# Patient Record
Sex: FEMALE | Race: WHITE | Employment: PART TIME | URBAN - METROPOLITAN AREA
[De-identification: names, ages, dates, MRNs, and addresses within clinical notes are randomized per-mention and may not be internally consistent; named-entity substitution may affect disease eponyms.]

---

## 2024-05-20 PROCEDURE — 99285 EMERGENCY DEPT VISIT HI MDM: CPT

## 2024-05-20 PROCEDURE — 99284 EMERGENCY DEPT VISIT MOD MDM: CPT

## 2024-05-20 RX ORDER — SODIUM CHLORIDE 9 MG/ML
125 INJECTION, SOLUTION INTRAVENOUS CONTINUOUS
Status: DISCONTINUED | OUTPATIENT
Start: 2024-05-20 | End: 2024-05-21

## 2024-05-20 RX ORDER — ESTRADIOL 0.1 MG/G
1 CREAM VAGINAL
COMMUNITY
Start: 2024-05-06

## 2024-05-21 ENCOUNTER — HOSPITAL ENCOUNTER (EMERGENCY)
Facility: HOSPITAL | Age: 79
Discharge: HOME | End: 2024-05-21
Attending: EMERGENCY MEDICINE

## 2024-05-21 ENCOUNTER — APPOINTMENT (OUTPATIENT)
Dept: CT IMAGING | Facility: HOSPITAL | Age: 79
End: 2024-05-21
Attending: EMERGENCY MEDICINE

## 2024-05-21 VITALS
HEART RATE: 60 BPM | HEIGHT: 66 IN | OXYGEN SATURATION: 99 % | DIASTOLIC BLOOD PRESSURE: 63 MMHG | SYSTOLIC BLOOD PRESSURE: 120 MMHG | TEMPERATURE: 99 F | RESPIRATION RATE: 14 BRPM | BODY MASS INDEX: 23.78 KG/M2 | WEIGHT: 148 LBS

## 2024-05-21 DIAGNOSIS — M54.9 BACK PAIN WITHOUT RADIATION: Primary | ICD-10-CM

## 2024-05-21 LAB
ALBUMIN SERPL-MCNC: 3.6 G/DL (ref 3.4–5)
ALBUMIN/GLOB SERPL: 1.1 {RATIO} (ref 1–2)
ALP LIVER SERPL-CCNC: 84 U/L
ALT SERPL-CCNC: 33 U/L
ANION GAP SERPL CALC-SCNC: 4 MMOL/L (ref 0–18)
AST SERPL-CCNC: 28 U/L (ref 15–37)
BASOPHILS # BLD AUTO: 0.04 X10(3) UL (ref 0–0.2)
BASOPHILS NFR BLD AUTO: 0.5 %
BILIRUB SERPL-MCNC: 0.3 MG/DL (ref 0.1–2)
BILIRUB UR QL STRIP.AUTO: NEGATIVE
BUN BLD-MCNC: 22 MG/DL (ref 9–23)
CALCIUM BLD-MCNC: 9.3 MG/DL (ref 8.5–10.1)
CHLORIDE SERPL-SCNC: 111 MMOL/L (ref 98–112)
CLARITY UR REFRACT.AUTO: CLEAR
CO2 SERPL-SCNC: 27 MMOL/L (ref 21–32)
COLOR UR AUTO: YELLOW
CREAT BLD-MCNC: 0.91 MG/DL
EGFRCR SERPLBLD CKD-EPI 2021: 64 ML/MIN/1.73M2 (ref 60–?)
EOSINOPHIL # BLD AUTO: 0.29 X10(3) UL (ref 0–0.7)
EOSINOPHIL NFR BLD AUTO: 3.5 %
ERYTHROCYTE [DISTWIDTH] IN BLOOD BY AUTOMATED COUNT: 13.2 %
GLOBULIN PLAS-MCNC: 3.2 G/DL (ref 2.8–4.4)
GLUCOSE BLD-MCNC: 106 MG/DL (ref 70–99)
GLUCOSE UR STRIP.AUTO-MCNC: NEGATIVE MG/DL
HCT VFR BLD AUTO: 36 %
HGB BLD-MCNC: 12.3 G/DL
IMM GRANULOCYTES # BLD AUTO: 0.03 X10(3) UL (ref 0–1)
IMM GRANULOCYTES NFR BLD: 0.4 %
KETONES UR STRIP.AUTO-MCNC: NEGATIVE MG/DL
LEUKOCYTE ESTERASE UR QL STRIP.AUTO: NEGATIVE
LYMPHOCYTES # BLD AUTO: 2.67 X10(3) UL (ref 1–4)
LYMPHOCYTES NFR BLD AUTO: 32.1 %
MCH RBC QN AUTO: 31.5 PG (ref 26–34)
MCHC RBC AUTO-ENTMCNC: 34.2 G/DL (ref 31–37)
MCV RBC AUTO: 92.3 FL
MONOCYTES # BLD AUTO: 0.96 X10(3) UL (ref 0.1–1)
MONOCYTES NFR BLD AUTO: 11.5 %
NEUTROPHILS # BLD AUTO: 4.34 X10 (3) UL (ref 1.5–7.7)
NEUTROPHILS # BLD AUTO: 4.34 X10(3) UL (ref 1.5–7.7)
NEUTROPHILS NFR BLD AUTO: 52 %
NITRITE UR QL STRIP.AUTO: NEGATIVE
OSMOLALITY SERPL CALC.SUM OF ELEC: 298 MOSM/KG (ref 275–295)
PH UR STRIP.AUTO: 6 [PH] (ref 5–8)
PLATELET # BLD AUTO: 300 10(3)UL (ref 150–450)
POTASSIUM SERPL-SCNC: 3.9 MMOL/L (ref 3.5–5.1)
PROT SERPL-MCNC: 6.8 G/DL (ref 6.4–8.2)
PROT UR STRIP.AUTO-MCNC: NEGATIVE MG/DL
RBC # BLD AUTO: 3.9 X10(6)UL
RBC UR QL AUTO: NEGATIVE
SODIUM SERPL-SCNC: 142 MMOL/L (ref 136–145)
SP GR UR STRIP.AUTO: 1.01 (ref 1–1.03)
UROBILINOGEN UR STRIP.AUTO-MCNC: 0.2 MG/DL
WBC # BLD AUTO: 8.3 X10(3) UL (ref 4–11)

## 2024-05-21 PROCEDURE — 96361 HYDRATE IV INFUSION ADD-ON: CPT

## 2024-05-21 PROCEDURE — 80053 COMPREHEN METABOLIC PANEL: CPT | Performed by: EMERGENCY MEDICINE

## 2024-05-21 PROCEDURE — 74177 CT ABD & PELVIS W/CONTRAST: CPT | Performed by: EMERGENCY MEDICINE

## 2024-05-21 PROCEDURE — 96375 TX/PRO/DX INJ NEW DRUG ADDON: CPT

## 2024-05-21 PROCEDURE — 85025 COMPLETE CBC W/AUTO DIFF WBC: CPT | Performed by: EMERGENCY MEDICINE

## 2024-05-21 PROCEDURE — 96365 THER/PROPH/DIAG IV INF INIT: CPT

## 2024-05-21 PROCEDURE — 81003 URINALYSIS AUTO W/O SCOPE: CPT | Performed by: EMERGENCY MEDICINE

## 2024-05-21 RX ORDER — HYDROCODONE BITARTRATE AND ACETAMINOPHEN 5; 325 MG/1; MG/1
1 TABLET ORAL ONCE
Status: COMPLETED | OUTPATIENT
Start: 2024-05-21 | End: 2024-05-21

## 2024-05-21 RX ORDER — MORPHINE SULFATE 4 MG/ML
4 INJECTION, SOLUTION INTRAMUSCULAR; INTRAVENOUS EVERY 30 MIN PRN
Status: DISCONTINUED | OUTPATIENT
Start: 2024-05-21 | End: 2024-05-21

## 2024-05-21 RX ORDER — HYDROCODONE BITARTRATE AND ACETAMINOPHEN 5; 325 MG/1; MG/1
1-2 TABLET ORAL EVERY 6 HOURS PRN
Qty: 20 TABLET | Refills: 0 | Status: SHIPPED | OUTPATIENT
Start: 2024-05-21 | End: 2024-05-28

## 2024-05-21 RX ORDER — ONDANSETRON 2 MG/ML
4 INJECTION INTRAMUSCULAR; INTRAVENOUS ONCE
Status: COMPLETED | OUTPATIENT
Start: 2024-05-21 | End: 2024-05-21

## 2024-05-21 RX ORDER — CEPHALEXIN 500 MG/1
500 CAPSULE ORAL 3 TIMES DAILY
Qty: 30 CAPSULE | Refills: 0 | Status: SHIPPED | OUTPATIENT
Start: 2024-05-21 | End: 2024-05-31

## 2024-05-21 NOTE — ED PROVIDER NOTES
Patient Seen in: [facility] Emergency Department      History     Chief Complaint   Patient presents with    Back Pain    Constipation     Stated Complaint: c/o low back pain radiating to abd for a few days. Also c/o constipation, \"smal*    Subjective:   HPI    78-year-old female presenting emerged part for multiple complaints main complaints she has back pain no lower thoracic upper lumbar region that she has had since getting off a plane on Friday.  Patient's pain does not radiate instantly she has been having some lower extremity swelling and some right upper quadrant pain.  Right upper quadrant pain has been evaluated multiple times has had her gallbladder taken out but has been ongoing issue for greater than 2 years.  The patient's leg swelling seems to have been an issue since being started on estrogen 2 weeks ago.  She denies any sort of exacerbating factors fevers chills or any other complaints.  She denies radiation of the pain loss of bowel or bladder function or urinary complaints.  She has been slightly constipated    Objective:   History reviewed. No pertinent past medical history.           Past Surgical History:   Procedure Laterality Date    Appendectomy      Colonoscopy  2018    normal     Other surgical history  2017    redo breast implants - silicone                 Social History     Socioeconomic History    Marital status:    Tobacco Use    Smoking status: Never    Smokeless tobacco: Never   Vaping Use    Vaping status: Never Used   Substance and Sexual Activity    Alcohol use: Yes     Alcohol/week: 1.0 standard drink of alcohol     Types: 1 Glasses of wine per week    Drug use: Never   Other Topics Concern    Seat Belt Yes              Review of Systems    Positive for stated complaint: c/o low back pain radiating to abd for a few days. Also c/o constipation, \"smal*  Other systems are as noted in HPI.  Constitutional and vital signs reviewed.      All other systems reviewed and  negative except as noted above.    Physical Exam     ED Triage Vitals [05/20/24 5089]   BP (!) 181/84   Pulse 67   Resp 16   Temp 98.5 °F (36.9 °C)   Temp src    SpO2 98 %   O2 Device None (Room air)       Current Vitals:   Vital Signs  BP: 153/69  Pulse: 71  Resp: 10  Temp: 98.5 °F (36.9 °C)  MAP (mmHg): 86    Oxygen Therapy  SpO2: 99 %  O2 Device: None (Room air)            Physical Exam    Awake alert patient appears no distress HEENT exam is normal lungs are clear cardiovascular exam regular rhythm abdomen soft nontender extremities 1+ pedal edema lower extremities pulse 2+ no breaks in the skin    ED Course     Labs Reviewed   COMP METABOLIC PANEL (14) - Abnormal; Notable for the following components:       Result Value    Glucose 106 (*)     Calculated Osmolality 298 (*)     All other components within normal limits   URINALYSIS WITH CULTURE REFLEX - Abnormal; Notable for the following components:    Squamous Epi. Cells Few (*)     All other components within normal limits   CBC WITH DIFFERENTIAL WITH PLATELET    Narrative:     The following orders were created for panel order CBC With Differential With Platelet.  Procedure                               Abnormality         Status                     ---------                               -----------         ------                     CBC W/ DIFFERENTIAL[[id number]]                              Final result                 Please view results for these tests on the individual orders.   CBC W/ DIFFERENTIAL             Differential diagnosis includes lytic lesion of the l, perforated viscus         MDM                                         Medical Decision Making  78-year-old female presenting emerged part for back pain.  IV established cardiac monitor shows a sinus rhythm pulse ox is no signs of hypoxia CBC shows no signs of anemia metabolic panel shows stable renal function.  Urinalysis shows no gross infection independent interpretation by ED physician of CT  scan shows degenerative disc disease but also shows asymmetric prominence of the left ureter.  Patient will be treated with a course of antibiotics pending urine culture results as well as pain medication for the back pain and ureteral swelling.  I have strongly recommended urology follow-up as well as primary care and possible spine follow-up when she returns back to the [place] next week she is to return to the emergency department for worsening symptoms other complaints  The patient was screened and evaluated during this visit.  As a treating physician attending to the patient, I determined, within reasonable clinical confidence and prior to discharge, that an emergency medical condition was not or was no longer present.  There was no indication for further evaluation, treatment or admission on an emergency basis.    The usual and customary discharge instructions were discussed given the patient's ER course.  We discussed signs and symptoms that should prompt the patient's immediate return to the emergency department.  Reasonable over-the-counter and prescription treatment options and physician follow-up plan was discussed.  Patient was discharged home in good condition  This note was prepared using Dragon Medical voice recognition dictation software.  As a result errors may occur.  When identified to these areas have been corrected.  While every attempt is made to correct errors during dictation discrepancies may still exist.  Please contact if there are any errors    Amount and/or Complexity of Data Reviewed  Labs: ordered. Decision-making details documented in ED Course.  Radiology: ordered and independent interpretation performed. Decision-making details documented in ED Course.  ECG/medicine tests: ordered and independent interpretation performed. Decision-making details documented in ED Course.        Disposition and Plan     Clinical Impression:  1. Back pain without radiation         Disposition:  There is  no disposition on file for this visit.  There is no disposition time on file for this visit.    Follow-up:  No follow-up provider specified.        Medications Prescribed:  Current Discharge Medication List        START taking these medications    Details   HYDROcodone-acetaminophen 5-325 MG Oral Tab Take 1-2 tablets by mouth every 6 (six) hours as needed.  Qty: 20 tablet, Refills: 0    Associated Diagnoses: Back pain without radiation      cephalexin 500 MG Oral Cap Take 1 capsule (500 mg total) by mouth 3 (three) times daily for 10 days.  Qty: 30 capsule, Refills: 0    Associated Diagnoses: Back pain without radiation

## 2024-05-21 NOTE — ED INITIAL ASSESSMENT (HPI)
Patient ambulatory to ED with complaint of right lower back pain x 4 weeks. Patient states pain is progressively worsening